# Patient Record
Sex: FEMALE | Race: ASIAN | NOT HISPANIC OR LATINO
[De-identification: names, ages, dates, MRNs, and addresses within clinical notes are randomized per-mention and may not be internally consistent; named-entity substitution may affect disease eponyms.]

---

## 2024-02-28 PROBLEM — Z00.00 ENCOUNTER FOR PREVENTIVE HEALTH EXAMINATION: Status: ACTIVE | Noted: 2024-02-28

## 2024-03-06 ENCOUNTER — APPOINTMENT (OUTPATIENT)
Dept: GASTROENTEROLOGY | Facility: CLINIC | Age: 89
End: 2024-03-06
Payer: MEDICARE

## 2024-03-06 VITALS
HEART RATE: 90 BPM | OXYGEN SATURATION: 94 % | WEIGHT: 160.4 LBS | DIASTOLIC BLOOD PRESSURE: 72 MMHG | SYSTOLIC BLOOD PRESSURE: 127 MMHG

## 2024-03-06 DIAGNOSIS — Z85.3 PERSONAL HISTORY OF MALIGNANT NEOPLASM OF BREAST: ICD-10-CM

## 2024-03-06 DIAGNOSIS — E78.5 HYPERLIPIDEMIA, UNSPECIFIED: ICD-10-CM

## 2024-03-06 DIAGNOSIS — K80.50 CALCULUS OF BILE DUCT W/OUT CHOLANGITIS OR CHOLECYSTITIS W/OUT OBSTRUCTION: ICD-10-CM

## 2024-03-06 DIAGNOSIS — I10 ESSENTIAL (PRIMARY) HYPERTENSION: ICD-10-CM

## 2024-03-06 DIAGNOSIS — Z87.891 PERSONAL HISTORY OF NICOTINE DEPENDENCE: ICD-10-CM

## 2024-03-06 DIAGNOSIS — E11.9 TYPE 2 DIABETES MELLITUS W/OUT COMPLICATIONS: ICD-10-CM

## 2024-03-06 PROCEDURE — 99205 OFFICE O/P NEW HI 60 MIN: CPT

## 2024-03-06 RX ORDER — ATORVASTATIN CALCIUM 20 MG/1
20 TABLET, FILM COATED ORAL
Refills: 0 | Status: ACTIVE | COMMUNITY

## 2024-03-06 RX ORDER — METFORMIN HYDROCHLORIDE 500 MG/1
500 TABLET, FILM COATED, EXTENDED RELEASE ORAL
Refills: 0 | Status: ACTIVE | COMMUNITY

## 2024-03-06 RX ORDER — LOSARTAN POTASSIUM 100 MG/1
100 TABLET, FILM COATED ORAL
Refills: 0 | Status: ACTIVE | COMMUNITY

## 2024-03-06 RX ORDER — VIT C/E/ZN/COPPR/LUTEIN/ZEAXAN 250MG-90MG
CAPSULE ORAL
Refills: 0 | Status: ACTIVE | COMMUNITY

## 2024-03-06 NOTE — HISTORY OF PRESENT ILLNESS
[FreeTextEntry1] : Patient is a 88 year old female with a history of DMII, HTN, HLD and breast cancer who presents due to referral from Dr. Estrdaa for evaluation due to choledocholithiasis. Patient states that she has been experiencing upper abdominal pain x 6 weeks.  The pain was worse with eating certain foods and did not get better with taking Pepto Bismol.  Patient followed up with Dr. Estrada and underwent upper endoscopy on 02/20/2024 that showed mild reflux esophagitis (biopsied) and mild gastritis (biopsied).  Pathology showed normal duodenal mucosa, gastric antrum with nonspecific reactive gastropathy, fundic mucosa within normal limits and distal esophagus within normal limits.  Due to continued discomfort, patient underwent CT scan of abdomen and pelvis on 2/26/2024 that showed intra and extrahepatic biliary ductal dilatation with rounded tissue density foci in the distal common bile duct causing biliary obstruction; the configuration is suggestive of choledocholithiasis but a neoplasm cannot be excluded.  Patient underwent lab evaluation on 2/16/2024 that showed  of 5328.2,  AST of 86, ALT of 108 and alk phos of 686.  Patient is currently taking Tramadol 25 mg as needed for the discomfort.  Patient denies nausea, vomiting, changes in bowel habits, dark stool and BRBPR.  [de-identified] :  2/26/2024 showed intra and extrahepatic biliary ductal dilatation with rounded tissue density foci in the distal common bile duct causing biliary obstruction; the configuration is suggestive of choledocholithiasis but a neoplasm cannot be excluded.

## 2024-03-06 NOTE — ASSESSMENT
[FreeTextEntry1] : Patient is a 88 year old female with a history of DMII, HTN, HLD and breast cancer who presents due to referral from Dr. Estrada for evaluation due to choledocholithiasis.    Patient to undergo ERCP at [facility].  R/B/C of procedure including possible stent placement and hospitalization post procedure for observation discussed with patient.  Escort for the procedure also reviewed.  Carolynn GARCIA; PA, am scribing for and in the presence of Dr. Tor Moreno the following sections: history of present illness, past medical/family/social history; review of systems; vital signs; physical exam; disposition.  Tor GARCIA MD, personally performed the services described in the documentation, reviewed the documentation recorded by the scribe in my presence and it accurately and completely records my words and actions.

## 2024-03-06 NOTE — PHYSICAL EXAM
[Alert] : alert [Abdomen Soft] : soft [Oriented To Time, Place, And Person] : oriented to person, place, and time [No Respiratory Distress] : no respiratory distress [Abdomen Tenderness] : non-tender

## 2024-03-07 ENCOUNTER — OUTPATIENT (OUTPATIENT)
Dept: OUTPATIENT SERVICES | Facility: HOSPITAL | Age: 89
LOS: 1 days | Discharge: HOME | End: 2024-03-07
Payer: MEDICARE

## 2024-03-07 ENCOUNTER — APPOINTMENT (OUTPATIENT)
Dept: GASTROENTEROLOGY | Facility: HOSPITAL | Age: 89
End: 2024-03-07

## 2024-03-07 VITALS
SYSTOLIC BLOOD PRESSURE: 91 MMHG | TEMPERATURE: 97 F | DIASTOLIC BLOOD PRESSURE: 54 MMHG | HEIGHT: 66 IN | RESPIRATION RATE: 16 BRPM | WEIGHT: 160.06 LBS | HEART RATE: 78 BPM | OXYGEN SATURATION: 99 %

## 2024-03-07 LAB
GLUCOSE BLDC GLUCOMTR-MCNC: 108 MG/DL — HIGH (ref 70–99)
GLUCOSE BLDC GLUCOMTR-MCNC: 126 MG/DL — HIGH (ref 70–99)

## 2024-03-07 PROCEDURE — 43262 ENDO CHOLANGIOPANCREATOGRAPH: CPT

## 2024-03-07 PROCEDURE — 43264 ERCP REMOVE DUCT CALCULI: CPT

## 2024-03-07 PROCEDURE — C1889: CPT

## 2024-03-07 PROCEDURE — 74328 X-RAY BILE DUCT ENDOSCOPY: CPT | Mod: 26

## 2024-03-07 PROCEDURE — C1769: CPT

## 2024-03-07 PROCEDURE — 82962 GLUCOSE BLOOD TEST: CPT

## 2024-03-07 PROCEDURE — 74328 X-RAY BILE DUCT ENDOSCOPY: CPT

## 2024-03-07 DEVICE — HYDRATOME 44: Type: IMPLANTABLE DEVICE | Status: FUNCTIONAL

## 2024-03-07 DEVICE — GWIRE FUSION LOKG DEV: Type: IMPLANTABLE DEVICE | Status: FUNCTIONAL

## 2024-03-07 DEVICE — BLLN EXTRCTR PRO RX-S 15-18MM: Type: IMPLANTABLE DEVICE | Status: FUNCTIONAL

## 2024-03-07 DEVICE — BLLN EXTRCTR PRO RX-S 12-15MM: Type: IMPLANTABLE DEVICE | Status: FUNCTIONAL

## 2024-03-07 RX ORDER — CIPROFLOXACIN HYDROCHLORIDE 500 MG/1
500 TABLET, FILM COATED ORAL
Qty: 6 | Refills: 0 | Status: ACTIVE | COMMUNITY
Start: 2024-03-07 | End: 1900-01-01

## 2024-03-07 NOTE — PRE-ANESTHESIA EVALUATION ADULT - NSANTHPMHFT_GEN_ALL_CORE
PMHx: Right Breast Ca, HTN, NIDDM, HLD, Arthritis, Hx Gastritis and Esophagitis in past    PSxHx; Cholecystectomy, Pituitary Sx, Right Mastectomy, Cataract Sx

## 2024-05-29 ENCOUNTER — APPOINTMENT (OUTPATIENT)
Dept: GASTROENTEROLOGY | Facility: CLINIC | Age: 89
End: 2024-05-29

## 2024-06-12 ENCOUNTER — APPOINTMENT (OUTPATIENT)
Dept: GASTROENTEROLOGY | Facility: CLINIC | Age: 89
End: 2024-06-12